# Patient Record
Sex: MALE | Race: WHITE | NOT HISPANIC OR LATINO | Employment: OTHER | URBAN - METROPOLITAN AREA
[De-identification: names, ages, dates, MRNs, and addresses within clinical notes are randomized per-mention and may not be internally consistent; named-entity substitution may affect disease eponyms.]

---

## 2023-05-18 LAB
ALANINE AMINOTRANSFERASE (SGPT) (U/L) IN SER/PLAS: 16 U/L (ref 10–52)
ALBUMIN (G/DL) IN SER/PLAS: 4.2 G/DL (ref 3.4–5)
ALBUMIN (MG/L) IN URINE: <7 MG/L
ALBUMIN/CREATININE (UG/MG) IN URINE: NORMAL UG/MG CRT (ref 0–30)
ALKALINE PHOSPHATASE (U/L) IN SER/PLAS: 57 U/L (ref 33–136)
ANION GAP IN SER/PLAS: 12 MMOL/L (ref 10–20)
ASPARTATE AMINOTRANSFERASE (SGOT) (U/L) IN SER/PLAS: 20 U/L (ref 9–39)
BILIRUBIN TOTAL (MG/DL) IN SER/PLAS: 0.8 MG/DL (ref 0–1.2)
CALCIUM (MG/DL) IN SER/PLAS: 9.2 MG/DL (ref 8.6–10.3)
CARBON DIOXIDE, TOTAL (MMOL/L) IN SER/PLAS: 26 MMOL/L (ref 21–32)
CHLORIDE (MMOL/L) IN SER/PLAS: 107 MMOL/L (ref 98–107)
CHOLESTEROL (MG/DL) IN SER/PLAS: 130 MG/DL (ref 0–199)
CHOLESTEROL IN HDL (MG/DL) IN SER/PLAS: 39.9 MG/DL
CHOLESTEROL/HDL RATIO: 3.3
CREATININE (MG/DL) IN SER/PLAS: 1.45 MG/DL (ref 0.5–1.3)
CREATININE (MG/DL) IN URINE: 122 MG/DL (ref 20–370)
GFR MALE: 51 ML/MIN/1.73M2
GLUCOSE (MG/DL) IN SER/PLAS: 87 MG/DL (ref 74–99)
LDL: 78 MG/DL (ref 0–99)
POTASSIUM (MMOL/L) IN SER/PLAS: 4.7 MMOL/L (ref 3.5–5.3)
PROTEIN TOTAL: 6.7 G/DL (ref 6.4–8.2)
SODIUM (MMOL/L) IN SER/PLAS: 140 MMOL/L (ref 136–145)
TRIGLYCERIDE (MG/DL) IN SER/PLAS: 63 MG/DL (ref 0–149)
URATE (MG/DL) IN SER/PLAS: 7 MG/DL (ref 4–7.5)
UREA NITROGEN (MG/DL) IN SER/PLAS: 35 MG/DL (ref 6–23)
VLDL: 13 MG/DL (ref 0–40)

## 2023-05-24 PROBLEM — I42.2 APICAL VARIANT HYPERTROPHIC CARDIOMYOPATHY (MULTI): Status: ACTIVE | Noted: 2023-05-24

## 2023-05-24 PROBLEM — I48.0 PAROXYSMAL ATRIAL FIBRILLATION (MULTI): Status: ACTIVE | Noted: 2023-05-24

## 2023-05-24 PROBLEM — L57.0 ACTINIC KERATOSIS OF LEFT TEMPLE: Status: ACTIVE | Noted: 2023-05-24

## 2023-05-24 PROBLEM — I43 CARDIOMYOPATHY DUE TO HYPERTENSION, WITHOUT HEART FAILURE (MULTI): Status: ACTIVE | Noted: 2023-05-24

## 2023-05-24 PROBLEM — M1A.00X0 CHRONIC GOUTY ARTHROPATHY: Status: ACTIVE | Noted: 2023-05-24

## 2023-05-24 PROBLEM — I11.9 CARDIOMYOPATHY DUE TO HYPERTENSION, WITHOUT HEART FAILURE (MULTI): Status: ACTIVE | Noted: 2023-05-24

## 2023-05-24 PROBLEM — I10 HYPERTENSION: Status: ACTIVE | Noted: 2023-05-24

## 2023-05-24 PROBLEM — I47.29 NSVT (NONSUSTAINED VENTRICULAR TACHYCARDIA) (MULTI): Status: ACTIVE | Noted: 2023-05-24

## 2023-05-24 PROBLEM — M79.672 LEFT FOOT PAIN: Status: ACTIVE | Noted: 2023-05-24

## 2023-05-24 PROBLEM — C61 PROSTATE CANCER (MULTI): Status: ACTIVE | Noted: 2023-05-24

## 2023-05-24 PROBLEM — H91.10 PRESBYCUSIS: Status: ACTIVE | Noted: 2023-05-24

## 2023-05-24 PROBLEM — M1A.9XX0 CHRONIC GOUTY ARTHROPATHY: Status: ACTIVE | Noted: 2023-05-24

## 2023-05-24 PROBLEM — H93.13 TINNITUS OF BOTH EARS: Status: ACTIVE | Noted: 2023-05-24

## 2023-05-24 PROBLEM — N18.31 STAGE 3A CHRONIC KIDNEY DISEASE (MULTI): Status: ACTIVE | Noted: 2023-05-24

## 2023-05-24 PROBLEM — I31.9: Status: ACTIVE | Noted: 2023-05-24

## 2023-05-24 RX ORDER — AMLODIPINE BESYLATE 5 MG/1
1 TABLET ORAL DAILY
COMMUNITY
Start: 2022-05-19 | End: 2023-11-13 | Stop reason: SDUPTHER

## 2023-05-24 RX ORDER — COLCHICINE 0.6 MG/1
1 TABLET ORAL DAILY
COMMUNITY
Start: 2020-12-28

## 2023-05-24 RX ORDER — METOPROLOL SUCCINATE 100 MG/1
1 TABLET, EXTENDED RELEASE ORAL 2 TIMES DAILY
COMMUNITY
Start: 2022-05-10

## 2023-05-25 ENCOUNTER — OFFICE VISIT (OUTPATIENT)
Dept: PRIMARY CARE | Facility: CLINIC | Age: 73
End: 2023-05-25
Payer: MEDICARE

## 2023-05-25 VITALS
HEIGHT: 71 IN | BODY MASS INDEX: 26.04 KG/M2 | RESPIRATION RATE: 16 BRPM | WEIGHT: 186 LBS | DIASTOLIC BLOOD PRESSURE: 60 MMHG | HEART RATE: 68 BPM | SYSTOLIC BLOOD PRESSURE: 114 MMHG

## 2023-05-25 DIAGNOSIS — I42.2 APICAL VARIANT HYPERTROPHIC CARDIOMYOPATHY (MULTI): ICD-10-CM

## 2023-05-25 DIAGNOSIS — N18.31 STAGE 3A CHRONIC KIDNEY DISEASE (MULTI): ICD-10-CM

## 2023-05-25 DIAGNOSIS — Z00.00 MEDICARE ANNUAL WELLNESS VISIT, SUBSEQUENT: Primary | ICD-10-CM

## 2023-05-25 DIAGNOSIS — C61 PROSTATE CANCER (MULTI): ICD-10-CM

## 2023-05-25 DIAGNOSIS — I48.0 PAROXYSMAL ATRIAL FIBRILLATION (MULTI): ICD-10-CM

## 2023-05-25 DIAGNOSIS — I47.29 NSVT (NONSUSTAINED VENTRICULAR TACHYCARDIA) (MULTI): ICD-10-CM

## 2023-05-25 DIAGNOSIS — I43 CARDIOMYOPATHY DUE TO HYPERTENSION, WITHOUT HEART FAILURE (MULTI): ICD-10-CM

## 2023-05-25 DIAGNOSIS — I11.9 CARDIOMYOPATHY DUE TO HYPERTENSION, WITHOUT HEART FAILURE (MULTI): ICD-10-CM

## 2023-05-25 PROBLEM — I31.9: Status: RESOLVED | Noted: 2023-05-24 | Resolved: 2023-05-25

## 2023-05-25 PROBLEM — I10 HYPERTENSION: Status: RESOLVED | Noted: 2023-05-24 | Resolved: 2023-05-25

## 2023-05-25 PROBLEM — M79.672 LEFT FOOT PAIN: Status: RESOLVED | Noted: 2023-05-24 | Resolved: 2023-05-25

## 2023-05-25 PROCEDURE — 1159F MED LIST DOCD IN RCRD: CPT | Performed by: INTERNAL MEDICINE

## 2023-05-25 PROCEDURE — 1170F FXNL STATUS ASSESSED: CPT | Performed by: INTERNAL MEDICINE

## 2023-05-25 PROCEDURE — G0442 ANNUAL ALCOHOL SCREEN 15 MIN: HCPCS | Performed by: INTERNAL MEDICINE

## 2023-05-25 PROCEDURE — 1160F RVW MEDS BY RX/DR IN RCRD: CPT | Performed by: INTERNAL MEDICINE

## 2023-05-25 PROCEDURE — G0444 DEPRESSION SCREEN ANNUAL: HCPCS | Performed by: INTERNAL MEDICINE

## 2023-05-25 PROCEDURE — 99214 OFFICE O/P EST MOD 30 MIN: CPT | Performed by: INTERNAL MEDICINE

## 2023-05-25 PROCEDURE — 1036F TOBACCO NON-USER: CPT | Performed by: INTERNAL MEDICINE

## 2023-05-25 PROCEDURE — G0439 PPPS, SUBSEQ VISIT: HCPCS | Performed by: INTERNAL MEDICINE

## 2023-05-25 PROCEDURE — 99497 ADVNCD CARE PLAN 30 MIN: CPT | Performed by: INTERNAL MEDICINE

## 2023-05-25 RX ORDER — ALLOPURINOL 100 MG/1
100 TABLET ORAL DAILY
COMMUNITY
Start: 2023-05-11 | End: 2023-11-13 | Stop reason: SDUPTHER

## 2023-05-25 ASSESSMENT — ENCOUNTER SYMPTOMS
OCCASIONAL FEELINGS OF UNSTEADINESS: 0
DEPRESSION: 0
LOSS OF SENSATION IN FEET: 0

## 2023-05-25 ASSESSMENT — ANXIETY QUESTIONNAIRES
3. WORRYING TOO MUCH ABOUT DIFFERENT THINGS: NOT AT ALL
4. TROUBLE RELAXING: NOT AT ALL
GAD7 TOTAL SCORE: 0
2. NOT BEING ABLE TO STOP OR CONTROL WORRYING: NOT AT ALL
7. FEELING AFRAID AS IF SOMETHING AWFUL MIGHT HAPPEN: NOT AT ALL
6. BECOMING EASILY ANNOYED OR IRRITABLE: NOT AT ALL
5. BEING SO RESTLESS THAT IT IS HARD TO SIT STILL: NOT AT ALL
IF YOU CHECKED OFF ANY PROBLEMS ON THIS QUESTIONNAIRE, HOW DIFFICULT HAVE THESE PROBLEMS MADE IT FOR YOU TO DO YOUR WORK, TAKE CARE OF THINGS AT HOME, OR GET ALONG WITH OTHER PEOPLE: NOT DIFFICULT AT ALL
1. FEELING NERVOUS, ANXIOUS, OR ON EDGE: NOT AT ALL

## 2023-05-25 ASSESSMENT — ACTIVITIES OF DAILY LIVING (ADL)
GROCERY_SHOPPING: INDEPENDENT
TAKING_MEDICATION: INDEPENDENT
DOING_HOUSEWORK: INDEPENDENT
MANAGING_FINANCES: INDEPENDENT
BATHING: INDEPENDENT
DRESSING: INDEPENDENT

## 2023-05-25 ASSESSMENT — PATIENT HEALTH QUESTIONNAIRE - PHQ9
SUM OF ALL RESPONSES TO PHQ9 QUESTIONS 1 AND 2: 0
2. FEELING DOWN, DEPRESSED OR HOPELESS: NOT AT ALL
1. LITTLE INTEREST OR PLEASURE IN DOING THINGS: NOT AT ALL

## 2023-05-25 NOTE — ASSESSMENT & PLAN NOTE
Blood pressure well controlled on amlodipine 5 mg daily and metoprolol succinate  mg daily well compensated

## 2023-05-25 NOTE — PROGRESS NOTES
"Subjective   Reason for Visit: Erwin Peña is an 72 y.o. male here for a Medicare Wellness visit.     Past Medical, Surgical, and Family History reviewed and updated in chart.    Reviewed all medications by prescribing practitioner or clinical pharmacist (such as prescriptions, OTCs, herbal therapies and supplements) and documented in the medical record.    HPI    Patient Care Team:  Trent Cantu DO as PCP - General  Trent Cantu DO as PCP - [facility] Attributed Provider     Review of Systems    Objective   Vitals:  /60   Pulse 68   Resp 16   Ht 1.803 m (5' 11\")   Wt 84.4 kg (186 lb)   BMI 25.94 kg/m²       Physical Exam    Assessment/Plan   Problem List Items Addressed This Visit    None         "

## 2023-05-25 NOTE — ASSESSMENT & PLAN NOTE
Heart EKG at home device stable on metoprolol continue Eliquis 5 mg twice a day for stroke risk reductionStable watches with Apple watch and [employer] device

## 2023-05-25 NOTE — ASSESSMENT & PLAN NOTE
Advanced medical directive discussed patient defers pneumococcal vaccine at this time did receive Shingrix vaccine in the past screenings for alcohol depression completed weight stable BMI 25.Cologuard testing completed May 24, 2022 good for 3 years negative reevaluate in 6 months

## 2023-05-25 NOTE — ASSESSMENT & PLAN NOTE
No evidence of recurrent ventricular arrhythmia patient decide whether he would want a defibrillator replaced in the future continue to monitor for now ejection fraction well preserved

## 2023-05-25 NOTE — ASSESSMENT & PLAN NOTE
>>ASSESSMENT AND PLAN FOR APICAL VARIANT HYPERTROPHIC CARDIOMYOPATHY (CMS/HCC) WRITTEN ON 5/25/2023 11:09 AM BY DESTINY AVALOS, DO    Recently evaluated by [facility] cardiology had defibrillator removed which resulted and pericardial tamponade and major bleeding does not wish defibrillator to be replaced last echocardiogram 2022 revealed 70% ejection fraction diastolic dysfunction documented history of paroxysmal ventricular tachyarrhythmia associated with hypertrophic cardiomyopathy apical variant stable at this time continue beta-blocker patient does not wish antiarrhythmic therapy follow with cardiologist on a yearly basis    >>ASSESSMENT AND PLAN FOR CARDIOMYOPATHY DUE TO HYPERTENSION, WITHOUT HEART FAILURE (CMS/HCC) WRITTEN ON 5/25/2023 11:09 AM BY DESTINY AVALOS, DO    Blood pressure well controlled on amlodipine 5 mg daily and metoprolol succinate  mg daily well compensated

## 2023-05-25 NOTE — ASSESSMENT & PLAN NOTE
No recurrent events of chronic acute on chronic gouty arthritis continue on colchicine 0.6 mg 1 tablet daily with allopurinol 100 mg daily uric acid acid level improved from 8.4-7.0

## 2023-05-25 NOTE — PROGRESS NOTES
"Subjective   Reason for Visit: Erwin Peña is an 72 y.o. male here for a Medicare Wellness visit.     Past Medical, Surgical, and Family History reviewed and updated in chart.    Reviewed all medications by prescribing practitioner or clinical pharmacist (such as prescriptions, OTCs, herbal therapies and supplements) and documented in the medical record.    HPI    Patient Care Team:  Trent Cantu DO as PCP - General  Trent Cantu DO as PCP - [facility] Attributed Provider     Review of Systems   All other systems reviewed and are negative.      Objective   Vitals:  /60   Pulse 68   Resp 16   Ht 1.803 m (5' 11\")   Wt 84.4 kg (186 lb)   BMI 25.94 kg/m²       Physical Exam  Vitals and nursing note reviewed.   Constitutional:       General: He is not in acute distress.     Appearance: Normal appearance. He is well-developed. He is not toxic-appearing.   HENT:      Head: Normocephalic and atraumatic.      Right Ear: Tympanic membrane and external ear normal.      Left Ear: Tympanic membrane and external ear normal.      Nose: Nose normal.      Mouth/Throat:      Mouth: Mucous membranes are moist.      Pharynx: Oropharynx is clear. No oropharyngeal exudate or posterior oropharyngeal erythema.      Tonsils: No tonsillar exudate. 2+ on the right. 2+ on the left.   Eyes:      Extraocular Movements: Extraocular movements intact.      Conjunctiva/sclera: Conjunctivae normal.   Cardiovascular:      Rate and Rhythm: Normal rate and regular rhythm.      Pulses: Normal pulses.      Heart sounds: Normal heart sounds. No murmur heard.  Pulmonary:      Effort: Pulmonary effort is normal.      Breath sounds: Normal breath sounds.   Abdominal:      General: Abdomen is flat. Bowel sounds are normal.      Palpations: Abdomen is soft.   Musculoskeletal:      Cervical back: Neck supple.   Feet:      Right foot:      Skin integrity: Skin integrity normal. No ulcer, blister, skin breakdown, erythema, warmth or " callus.      Toenail Condition: Right toenails are normal.      Left foot:      Skin integrity: Skin integrity normal. No ulcer, blister, skin breakdown, erythema, warmth or callus.      Toenail Condition: Left toenails are normal.   Lymphadenopathy:      Cervical: No cervical adenopathy.   Skin:     General: Skin is warm and dry.      Capillary Refill: Capillary refill takes more than 3 seconds.      Findings: No rash.   Neurological:      Mental Status: He is alert. Mental status is at baseline.      Sensory: Sensation is intact.   Psychiatric:         Mood and Affect: Mood normal.         Behavior: Behavior normal.         Thought Content: Thought content normal.         Judgment: Judgment normal.         Assessment/Plan   Problem List Items Addressed This Visit          Circulatory    Apical variant hypertrophic cardiomyopathy (CMS/HCC)    Current Assessment & Plan     Recently evaluated by [facility] cardiology had defibrillator removed which resulted and pericardial tamponade and major bleeding does not wish defibrillator to be replaced last echocardiogram 2022 revealed 70% ejection fraction diastolic dysfunction documented history of paroxysmal ventricular tachyarrhythmia associated with hypertrophic cardiomyopathy apical variant stable at this time continue beta-blocker patient does not wish antiarrhythmic therapy follow with cardiologist on a yearly basis         Relevant Orders    Follow Up In [facility] - PCP    Hepatitis C antibody    Comprehensive metabolic panel    Lipid Panel    Cardiomyopathy due to hypertension, without heart failure (CMS/HCC)    Current Assessment & Plan     Blood pressure well controlled on amlodipine 5 mg daily and metoprolol succinate  mg daily well compensated         Relevant Orders    Follow Up In [facility] - PCP    Hepatitis C antibody    Comprehensive metabolic panel    Lipid Panel    NSVT (nonsustained ventricular tachycardia) (CMS/HCC)     Current Assessment & Plan     No evidence of recurrent ventricular arrhythmia patient decide whether he would want a defibrillator replaced in the future continue to monitor for now ejection fraction well preserved         Relevant Orders    Follow Up In [facility] - PCP    Hepatitis C antibody    Comprehensive metabolic panel    Lipid Panel    Paroxysmal atrial fibrillation (CMS/HCC)    Current Assessment & Plan     Heart EKG at home device stable on metoprolol continue Eliquis 5 mg twice a day for stroke risk reductionStable watches with Apple watch and [employer] device          Relevant Orders    Follow Up In [facility] - PCP    Hepatitis C antibody    Comprehensive metabolic panel    Lipid Panel       Genitourinary    Prostate cancer (CMS/HCC)    Current Assessment & Plan     Follows annually with [facility] physician with PSA urology stable         Relevant Orders    Follow Up In [facility] - PCP    Hepatitis C antibody    Comprehensive metabolic panel    Lipid Panel    Stage 3a chronic kidney disease    Current Assessment & Plan     Stable creatinine of 1.4 GFR 55-58 no evidence of proteinuria monitor 6-month basis            Other    Medicare annual wellness visit, subsequent - Primary    Current Assessment & Plan     Advanced medical directive discussed patient defers pneumococcal vaccine at this time did receive Shingrix vaccine in the past screenings for alcohol depression completed weight stable BMI 25.Cologuard testing completed May 24, 2022 good for 3 years negative reevaluate in 6 months         Relevant Orders    Follow Up In [facility] - PCP    Hepatitis C antibody    Comprehensive metabolic panel    Lipid Panel     Other Visit Diagnoses       Routine general medical examination at health care facility

## 2023-11-13 ENCOUNTER — TELEPHONE (OUTPATIENT)
Dept: PRIMARY CARE | Facility: CLINIC | Age: 73
End: 2023-11-13
Payer: MEDICARE

## 2023-11-13 DIAGNOSIS — I48.0 PAROXYSMAL ATRIAL FIBRILLATION (MULTI): ICD-10-CM

## 2023-11-13 DIAGNOSIS — I11.9 CARDIOMYOPATHY DUE TO HYPERTENSION, WITHOUT HEART FAILURE (MULTI): ICD-10-CM

## 2023-11-13 DIAGNOSIS — I43 CARDIOMYOPATHY DUE TO HYPERTENSION, WITHOUT HEART FAILURE (MULTI): ICD-10-CM

## 2023-11-13 DIAGNOSIS — M1A.00X0 CHRONIC GOUTY ARTHROPATHY: ICD-10-CM

## 2023-11-13 RX ORDER — AMLODIPINE BESYLATE 5 MG/1
5 TABLET ORAL DAILY
Qty: 90 TABLET | Refills: 3 | Status: SHIPPED | OUTPATIENT
Start: 2023-11-13

## 2023-11-13 RX ORDER — ALLOPURINOL 100 MG/1
100 TABLET ORAL DAILY
Qty: 90 TABLET | Refills: 3 | Status: SHIPPED | OUTPATIENT
Start: 2023-11-13

## 2023-11-13 NOTE — TELEPHONE ENCOUNTER
Patient requesting a refill on  Allopurinol 100mg Caps every day  Amlodipine 5mg every day   Eliquis 5mg BID  Jose G Low

## 2023-11-21 ENCOUNTER — LAB (OUTPATIENT)
Dept: LAB | Facility: LAB | Age: 73
End: 2023-11-21
Payer: MEDICARE

## 2023-11-21 DIAGNOSIS — I42.2 APICAL VARIANT HYPERTROPHIC CARDIOMYOPATHY (MULTI): ICD-10-CM

## 2023-11-21 DIAGNOSIS — C61 PROSTATE CANCER (MULTI): ICD-10-CM

## 2023-11-21 DIAGNOSIS — I43 CARDIOMYOPATHY DUE TO HYPERTENSION, WITHOUT HEART FAILURE (MULTI): ICD-10-CM

## 2023-11-21 DIAGNOSIS — Z00.00 MEDICARE ANNUAL WELLNESS VISIT, SUBSEQUENT: ICD-10-CM

## 2023-11-21 DIAGNOSIS — I47.29 NSVT (NONSUSTAINED VENTRICULAR TACHYCARDIA) (MULTI): ICD-10-CM

## 2023-11-21 DIAGNOSIS — I48.0 PAROXYSMAL ATRIAL FIBRILLATION (MULTI): ICD-10-CM

## 2023-11-21 DIAGNOSIS — I11.9 CARDIOMYOPATHY DUE TO HYPERTENSION, WITHOUT HEART FAILURE (MULTI): ICD-10-CM

## 2023-11-21 LAB
ALBUMIN SERPL BCP-MCNC: 4.2 G/DL (ref 3.4–5)
ALP SERPL-CCNC: 62 U/L (ref 33–136)
ALT SERPL W P-5'-P-CCNC: 15 U/L (ref 10–52)
ANION GAP SERPL CALC-SCNC: 12 MMOL/L (ref 10–20)
AST SERPL W P-5'-P-CCNC: 19 U/L (ref 9–39)
BILIRUB SERPL-MCNC: 0.9 MG/DL (ref 0–1.2)
BUN SERPL-MCNC: 29 MG/DL (ref 6–23)
CALCIUM SERPL-MCNC: 9.2 MG/DL (ref 8.6–10.3)
CHLORIDE SERPL-SCNC: 104 MMOL/L (ref 98–107)
CHOLEST SERPL-MCNC: 138 MG/DL (ref 0–199)
CHOLESTEROL/HDL RATIO: 3.1
CO2 SERPL-SCNC: 28 MMOL/L (ref 21–32)
CREAT SERPL-MCNC: 1.38 MG/DL (ref 0.5–1.3)
GFR SERPL CREATININE-BSD FRML MDRD: 54 ML/MIN/1.73M*2
GLUCOSE SERPL-MCNC: 87 MG/DL (ref 74–99)
HCV AB SER QL: NONREACTIVE
HDLC SERPL-MCNC: 45 MG/DL
LDLC SERPL CALC-MCNC: 78 MG/DL
NON HDL CHOLESTEROL: 93 MG/DL (ref 0–149)
POTASSIUM SERPL-SCNC: 4.3 MMOL/L (ref 3.5–5.3)
PROT SERPL-MCNC: 6.4 G/DL (ref 6.4–8.2)
SODIUM SERPL-SCNC: 140 MMOL/L (ref 136–145)
TRIGL SERPL-MCNC: 77 MG/DL (ref 0–149)
VLDL: 15 MG/DL (ref 0–40)

## 2023-11-21 PROCEDURE — 80053 COMPREHEN METABOLIC PANEL: CPT

## 2023-11-21 PROCEDURE — 86803 HEPATITIS C AB TEST: CPT

## 2023-11-21 PROCEDURE — 36415 COLL VENOUS BLD VENIPUNCTURE: CPT

## 2023-11-21 PROCEDURE — 80061 LIPID PANEL: CPT

## 2023-11-27 ENCOUNTER — APPOINTMENT (OUTPATIENT)
Dept: PRIMARY CARE | Facility: CLINIC | Age: 73
End: 2023-11-27
Payer: MEDICARE

## 2024-01-19 ENCOUNTER — APPOINTMENT (OUTPATIENT)
Dept: PRIMARY CARE | Facility: CLINIC | Age: 74
End: 2024-01-19
Payer: MEDICARE

## 2024-01-31 ENCOUNTER — OFFICE VISIT (OUTPATIENT)
Dept: PRIMARY CARE | Facility: CLINIC | Age: 74
End: 2024-01-31
Payer: MEDICARE

## 2024-01-31 VITALS
HEIGHT: 71 IN | HEART RATE: 60 BPM | WEIGHT: 189 LBS | SYSTOLIC BLOOD PRESSURE: 128 MMHG | BODY MASS INDEX: 26.46 KG/M2 | DIASTOLIC BLOOD PRESSURE: 60 MMHG

## 2024-01-31 DIAGNOSIS — C61 PROSTATE CANCER (MULTI): ICD-10-CM

## 2024-01-31 DIAGNOSIS — I43 CARDIOMYOPATHY DUE TO HYPERTENSION, WITHOUT HEART FAILURE (MULTI): ICD-10-CM

## 2024-01-31 DIAGNOSIS — I48.0 PAROXYSMAL ATRIAL FIBRILLATION (MULTI): ICD-10-CM

## 2024-01-31 DIAGNOSIS — N18.31 STAGE 3A CHRONIC KIDNEY DISEASE (MULTI): ICD-10-CM

## 2024-01-31 DIAGNOSIS — I42.2 APICAL VARIANT HYPERTROPHIC CARDIOMYOPATHY (MULTI): Primary | ICD-10-CM

## 2024-01-31 DIAGNOSIS — I11.9 CARDIOMYOPATHY DUE TO HYPERTENSION, WITHOUT HEART FAILURE (MULTI): ICD-10-CM

## 2024-01-31 DIAGNOSIS — L57.0 ACTINIC KERATOSIS OF LEFT TEMPLE: ICD-10-CM

## 2024-01-31 DIAGNOSIS — I47.29 NSVT (NONSUSTAINED VENTRICULAR TACHYCARDIA) (MULTI): ICD-10-CM

## 2024-01-31 PROCEDURE — 1160F RVW MEDS BY RX/DR IN RCRD: CPT | Performed by: INTERNAL MEDICINE

## 2024-01-31 PROCEDURE — 1036F TOBACCO NON-USER: CPT | Performed by: INTERNAL MEDICINE

## 2024-01-31 PROCEDURE — 99214 OFFICE O/P EST MOD 30 MIN: CPT | Performed by: INTERNAL MEDICINE

## 2024-01-31 PROCEDURE — 1159F MED LIST DOCD IN RCRD: CPT | Performed by: INTERNAL MEDICINE

## 2024-01-31 ASSESSMENT — ANXIETY QUESTIONNAIRES
5. BEING SO RESTLESS THAT IT IS HARD TO SIT STILL: NOT AT ALL
IF YOU CHECKED OFF ANY PROBLEMS ON THIS QUESTIONNAIRE, HOW DIFFICULT HAVE THESE PROBLEMS MADE IT FOR YOU TO DO YOUR WORK, TAKE CARE OF THINGS AT HOME, OR GET ALONG WITH OTHER PEOPLE: NOT DIFFICULT AT ALL
GAD7 TOTAL SCORE: 0
2. NOT BEING ABLE TO STOP OR CONTROL WORRYING: NOT AT ALL
1. FEELING NERVOUS, ANXIOUS, OR ON EDGE: NOT AT ALL
4. TROUBLE RELAXING: NOT AT ALL
3. WORRYING TOO MUCH ABOUT DIFFERENT THINGS: NOT AT ALL
7. FEELING AFRAID AS IF SOMETHING AWFUL MIGHT HAPPEN: NOT AT ALL
6. BECOMING EASILY ANNOYED OR IRRITABLE: NOT AT ALL

## 2024-01-31 ASSESSMENT — COLUMBIA-SUICIDE SEVERITY RATING SCALE - C-SSRS
1. IN THE PAST MONTH, HAVE YOU WISHED YOU WERE DEAD OR WISHED YOU COULD GO TO SLEEP AND NOT WAKE UP?: NO
6. HAVE YOU EVER DONE ANYTHING, STARTED TO DO ANYTHING, OR PREPARED TO DO ANYTHING TO END YOUR LIFE?: NO
2. HAVE YOU ACTUALLY HAD ANY THOUGHTS OF KILLING YOURSELF?: NO

## 2024-01-31 ASSESSMENT — ENCOUNTER SYMPTOMS
LOSS OF SENSATION IN FEET: 0
DEPRESSION: 0
OCCASIONAL FEELINGS OF UNSTEADINESS: 0

## 2024-01-31 ASSESSMENT — PATIENT HEALTH QUESTIONNAIRE - PHQ9
1. LITTLE INTEREST OR PLEASURE IN DOING THINGS: NOT AT ALL
2. FEELING DOWN, DEPRESSED OR HOPELESS: NOT AT ALL
SUM OF ALL RESPONSES TO PHQ9 QUESTIONS 1 AND 2: 0

## 2024-01-31 NOTE — ASSESSMENT & PLAN NOTE
Watchful waiting at this time PSA levels stabilized at 6 cans consider ADT therapy of symptoms change or PSA continues to elevate follows with urologist at [facility]

## 2024-01-31 NOTE — ASSESSMENT & PLAN NOTE
No major recurrent flares at this time continues on allopurinol 100 mg daily with as needed use of colchicine check uric acid levels with next visit

## 2024-01-31 NOTE — ASSESSMENT & PLAN NOTE
Patient with complications with insertion of ICD resulted in pericarditis and bleeding patient does not wish to entertain ICD will discuss with cardiologist may be a candidate for wireless ICD in the future continue with echocardiogram ejection fraction improved at this time no symptoms of congestive heart failure blood pressure well-controlled on amlodipine 5 mg daily

## 2024-01-31 NOTE — ASSESSMENT & PLAN NOTE
Stable rate and rhythm controlled continue metoprolol succinate  mg daily and apixaban 5 mg twice a day follow with cardiology

## 2024-01-31 NOTE — PROGRESS NOTES
"Subjective   Patient ID: Erwin Peña is a 73 y.o. male who presents for Follow-up.    HPI     Review of Systems    Objective   Ht 1.803 m (5' 11\")   Wt 85.7 kg (189 lb)   BMI 26.36 kg/m²     Physical Exam    Assessment/Plan          "

## 2024-01-31 NOTE — ASSESSMENT & PLAN NOTE
History of melanoma scapular area follows with dermatologist on an annual basis stable treatment of actinic keratosis

## 2024-01-31 NOTE — ASSESSMENT & PLAN NOTE
GFR of 54 with creatinine of 1.4 unchanged in last 3 years check microalbuminuria may be candidate for SGLT2 inhibitor therapy to reduce risk of CHF

## 2024-01-31 NOTE — PROGRESS NOTES
"Subjective   Reason for Visit: Erwin Peña is an 73 y.o. male here for a fu visit.     Past Medical, Surgical, and Family History reviewed and updated in chart.         HPI    Patient Care Team:  Trent Cantu DO as PCP - General  Trent Cantu DO as PCP - [facility] Attributed Provider     Review of Systems   All other systems reviewed and are negative.      Objective   Vitals:  /60 (BP Location: Right arm, Patient Position: Sitting)   Pulse 60   Ht 1.803 m (5' 11\")   Wt 85.7 kg (189 lb)   BMI 26.36 kg/m²       Physical Exam  Vitals and nursing note reviewed.   Constitutional:       General: He is not in acute distress.     Appearance: Normal appearance. He is well-developed. He is not toxic-appearing.   HENT:      Head: Normocephalic and atraumatic.      Right Ear: Tympanic membrane and external ear normal.      Left Ear: Tympanic membrane and external ear normal.      Nose: Nose normal.      Mouth/Throat:      Mouth: Mucous membranes are moist.      Pharynx: Oropharynx is clear. No oropharyngeal exudate or posterior oropharyngeal erythema.      Tonsils: No tonsillar exudate. 2+ on the right. 2+ on the left.   Eyes:      Extraocular Movements: Extraocular movements intact.      Conjunctiva/sclera: Conjunctivae normal.   Cardiovascular:      Rate and Rhythm: Normal rate and regular rhythm.      Pulses: Normal pulses.      Heart sounds: Normal heart sounds. No murmur heard.  Pulmonary:      Effort: Pulmonary effort is normal.      Breath sounds: Normal breath sounds.   Abdominal:      General: Abdomen is flat. Bowel sounds are normal.      Palpations: Abdomen is soft.   Musculoskeletal:      Cervical back: Neck supple.   Feet:      Right foot:      Skin integrity: Skin integrity normal. No ulcer, blister, skin breakdown, erythema, warmth or callus.      Toenail Condition: Right toenails are normal.      Left foot:      Skin integrity: Skin integrity normal. No ulcer, blister, skin breakdown, " erythema, warmth or callus.      Toenail Condition: Left toenails are normal.   Lymphadenopathy:      Cervical: No cervical adenopathy.   Skin:     General: Skin is warm and dry.      Capillary Refill: Capillary refill takes more than 3 seconds.      Findings: No rash.   Neurological:      Mental Status: He is alert. Mental status is at baseline.      Sensory: Sensation is intact.   Psychiatric:         Mood and Affect: Mood normal.         Behavior: Behavior normal.         Thought Content: Thought content normal.         Judgment: Judgment normal.         Assessment/Plan   Problem List Items Addressed This Visit       Actinic keratosis of left temple    Current Assessment & Plan     History of melanoma scapular area follows with dermatologist on an annual basis stable treatment of actinic keratosis         Apical variant hypertrophic cardiomyopathy (CMS/HCC) - Primary    Current Assessment & Plan     Patient with complications with insertion of ICD resulted in pericarditis and bleeding patient does not wish to entertain ICD will discuss with cardiologist may be a candidate for wireless ICD in the future continue with echocardiogram ejection fraction improved at this time no symptoms of congestive heart failure blood pressure well-controlled on amlodipine 5 mg daily         Relevant Orders    CBC and Auto Differential    Comprehensive metabolic panel    Uric acid    Albumin, urine, random    Lipid Panel    Follow Up In [facility] - PCP - Established    NSVT (nonsustained ventricular tachycardia) (CMS/HCC)    Current Assessment & Plan     No sustained ventricular events at this time continue to monitor         Relevant Orders    CBC and Auto Differential    Comprehensive metabolic panel    Uric acid    Albumin, urine, random    Lipid Panel    Follow Up In [facility] - PCP - Established    Paroxysmal atrial fibrillation (CMS/HCC)    Current Assessment & Plan     Stable rate and rhythm controlled  continue metoprolol succinate  mg daily and apixaban 5 mg twice a day follow with cardiology         Relevant Orders    CBC and Auto Differential    Comprehensive metabolic panel    Uric acid    Albumin, urine, random    Lipid Panel    Follow Up In [facility] - PCP - Established    Prostate cancer (CMS/[facility])    Current Assessment & Plan     Watchful waiting at this time PSA levels stabilized at 6 cans consider ADT therapy of symptoms change or PSA continues to elevate follows with urologist at [facility]         Relevant Orders    CBC and Auto Differential    Comprehensive metabolic panel    Uric acid    Albumin, urine, random    Lipid Panel    Follow Up In [facility] - PCP - Established    Stage 3a chronic kidney disease (CMS/[facility])    Current Assessment & Plan     GFR of 54 with creatinine of 1.4 unchanged in last 3 years check microalbuminuria may be candidate for SGLT2 inhibitor therapy to reduce risk of CHF         Relevant Orders    CBC and Auto Differential    Comprehensive metabolic panel    Uric acid    Albumin, urine, random    Lipid Panel    Follow Up In [facility] - PCP - Established    Cardiomyopathy due to hypertension, without heart failure (CMS/HCC)    Current Assessment & Plan     Well compensated at this time continue to monitor with cardiologist improvement in ejection fractionContinue metoprolol succinate  mg daily with amlodipine 5 mg daily         Relevant Orders    CBC and Auto Differential    Comprehensive metabolic panel    Uric acid    Albumin, urine, random    Lipid Panel    Follow Up In [facility] - PCP - Established

## 2024-02-01 PROBLEM — I43 CARDIOMYOPATHY DUE TO HYPERTENSION, WITHOUT HEART FAILURE (MULTI): Status: ACTIVE | Noted: 2024-02-01

## 2024-02-01 PROBLEM — I11.9 CARDIOMYOPATHY DUE TO HYPERTENSION, WITHOUT HEART FAILURE (MULTI): Status: ACTIVE | Noted: 2024-02-01

## 2024-02-02 NOTE — ASSESSMENT & PLAN NOTE
Well compensated at this time continue to monitor with cardiologist improvement in ejection fractionContinue metoprolol succinate  mg daily with amlodipine 5 mg daily

## 2024-07-29 ENCOUNTER — LAB (OUTPATIENT)
Dept: LAB | Facility: LAB | Age: 74
End: 2024-07-29
Payer: MEDICARE

## 2024-07-29 DIAGNOSIS — I47.29 NSVT (NONSUSTAINED VENTRICULAR TACHYCARDIA) (MULTI): ICD-10-CM

## 2024-07-29 DIAGNOSIS — I43 CARDIOMYOPATHY DUE TO HYPERTENSION, WITHOUT HEART FAILURE (MULTI): ICD-10-CM

## 2024-07-29 DIAGNOSIS — I11.9 CARDIOMYOPATHY DUE TO HYPERTENSION, WITHOUT HEART FAILURE (MULTI): ICD-10-CM

## 2024-07-29 DIAGNOSIS — I42.2 APICAL VARIANT HYPERTROPHIC CARDIOMYOPATHY (MULTI): ICD-10-CM

## 2024-07-29 DIAGNOSIS — I48.0 PAROXYSMAL ATRIAL FIBRILLATION (MULTI): ICD-10-CM

## 2024-07-29 DIAGNOSIS — N18.31 STAGE 3A CHRONIC KIDNEY DISEASE (MULTI): ICD-10-CM

## 2024-07-29 DIAGNOSIS — C61 PROSTATE CANCER (MULTI): ICD-10-CM

## 2024-07-29 LAB
ALBUMIN SERPL BCP-MCNC: 4 G/DL (ref 3.4–5)
ALP SERPL-CCNC: 68 U/L (ref 33–136)
ALT SERPL W P-5'-P-CCNC: 15 U/L (ref 10–52)
ANION GAP SERPL CALC-SCNC: 11 MMOL/L (ref 10–20)
AST SERPL W P-5'-P-CCNC: 15 U/L (ref 9–39)
BASOPHILS # BLD AUTO: 0.03 X10*3/UL (ref 0–0.1)
BASOPHILS NFR BLD AUTO: 0.5 %
BILIRUB SERPL-MCNC: 1.1 MG/DL (ref 0–1.2)
BUN SERPL-MCNC: 26 MG/DL (ref 6–23)
CALCIUM SERPL-MCNC: 9 MG/DL (ref 8.6–10.3)
CHLORIDE SERPL-SCNC: 105 MMOL/L (ref 98–107)
CHOLEST SERPL-MCNC: 129 MG/DL (ref 0–199)
CHOLESTEROL/HDL RATIO: 3.3
CO2 SERPL-SCNC: 28 MMOL/L (ref 21–32)
CREAT SERPL-MCNC: 1.42 MG/DL (ref 0.5–1.3)
CREAT UR-MCNC: 159.6 MG/DL (ref 20–370)
EGFRCR SERPLBLD CKD-EPI 2021: 53 ML/MIN/1.73M*2
EOSINOPHIL # BLD AUTO: 0.14 X10*3/UL (ref 0–0.4)
EOSINOPHIL NFR BLD AUTO: 2.4 %
ERYTHROCYTE [DISTWIDTH] IN BLOOD BY AUTOMATED COUNT: 12.9 % (ref 11.5–14.5)
GLUCOSE SERPL-MCNC: 82 MG/DL (ref 74–99)
HCT VFR BLD AUTO: 47.4 % (ref 41–52)
HDLC SERPL-MCNC: 38.6 MG/DL
HGB BLD-MCNC: 15.3 G/DL (ref 13.5–17.5)
IMM GRANULOCYTES # BLD AUTO: 0.02 X10*3/UL (ref 0–0.5)
IMM GRANULOCYTES NFR BLD AUTO: 0.3 % (ref 0–0.9)
LDLC SERPL CALC-MCNC: 73 MG/DL
LYMPHOCYTES # BLD AUTO: 1.48 X10*3/UL (ref 0.8–3)
LYMPHOCYTES NFR BLD AUTO: 25 %
MCH RBC QN AUTO: 29.9 PG (ref 26–34)
MCHC RBC AUTO-ENTMCNC: 32.3 G/DL (ref 32–36)
MCV RBC AUTO: 93 FL (ref 80–100)
MICROALBUMIN UR-MCNC: 10.7 MG/L
MICROALBUMIN/CREAT UR: 6.7 UG/MG CREAT
MONOCYTES # BLD AUTO: 0.54 X10*3/UL (ref 0.05–0.8)
MONOCYTES NFR BLD AUTO: 9.1 %
NEUTROPHILS # BLD AUTO: 3.72 X10*3/UL (ref 1.6–5.5)
NEUTROPHILS NFR BLD AUTO: 62.7 %
NON HDL CHOLESTEROL: 90 MG/DL (ref 0–149)
NRBC BLD-RTO: 0 /100 WBCS (ref 0–0)
PLATELET # BLD AUTO: 177 X10*3/UL (ref 150–450)
POTASSIUM SERPL-SCNC: 4.4 MMOL/L (ref 3.5–5.3)
PROT SERPL-MCNC: 6.3 G/DL (ref 6.4–8.2)
RBC # BLD AUTO: 5.11 X10*6/UL (ref 4.5–5.9)
SODIUM SERPL-SCNC: 140 MMOL/L (ref 136–145)
TRIGL SERPL-MCNC: 88 MG/DL (ref 0–149)
URATE SERPL-MCNC: 6.5 MG/DL (ref 4–7.5)
VLDL: 18 MG/DL (ref 0–40)
WBC # BLD AUTO: 5.9 X10*3/UL (ref 4.4–11.3)

## 2024-07-29 PROCEDURE — 80053 COMPREHEN METABOLIC PANEL: CPT

## 2024-07-29 PROCEDURE — 80061 LIPID PANEL: CPT

## 2024-07-29 PROCEDURE — 36415 COLL VENOUS BLD VENIPUNCTURE: CPT

## 2024-07-29 PROCEDURE — 85025 COMPLETE CBC W/AUTO DIFF WBC: CPT

## 2024-07-29 PROCEDURE — 82570 ASSAY OF URINE CREATININE: CPT

## 2024-07-29 PROCEDURE — 82043 UR ALBUMIN QUANTITATIVE: CPT

## 2024-07-29 PROCEDURE — 84550 ASSAY OF BLOOD/URIC ACID: CPT

## 2024-07-31 ENCOUNTER — APPOINTMENT (OUTPATIENT)
Dept: PRIMARY CARE | Facility: CLINIC | Age: 74
End: 2024-07-31
Payer: MEDICARE

## 2024-07-31 VITALS
SYSTOLIC BLOOD PRESSURE: 114 MMHG | HEIGHT: 71 IN | DIASTOLIC BLOOD PRESSURE: 70 MMHG | WEIGHT: 174 LBS | HEART RATE: 66 BPM | BODY MASS INDEX: 24.36 KG/M2

## 2024-07-31 DIAGNOSIS — M1A.00X0 CHRONIC GOUTY ARTHROPATHY: ICD-10-CM

## 2024-07-31 DIAGNOSIS — Z00.00 MEDICARE ANNUAL WELLNESS VISIT, SUBSEQUENT: Primary | ICD-10-CM

## 2024-07-31 DIAGNOSIS — N18.31 STAGE 3A CHRONIC KIDNEY DISEASE (MULTI): ICD-10-CM

## 2024-07-31 DIAGNOSIS — I47.29 NSVT (NONSUSTAINED VENTRICULAR TACHYCARDIA) (MULTI): ICD-10-CM

## 2024-07-31 DIAGNOSIS — I43 CARDIOMYOPATHY DUE TO HYPERTENSION, WITHOUT HEART FAILURE (MULTI): ICD-10-CM

## 2024-07-31 DIAGNOSIS — I11.9 CARDIOMYOPATHY DUE TO HYPERTENSION, WITHOUT HEART FAILURE (MULTI): ICD-10-CM

## 2024-07-31 DIAGNOSIS — I48.0 PAROXYSMAL ATRIAL FIBRILLATION (MULTI): ICD-10-CM

## 2024-07-31 DIAGNOSIS — I42.2 APICAL VARIANT HYPERTROPHIC CARDIOMYOPATHY (MULTI): ICD-10-CM

## 2024-07-31 DIAGNOSIS — C61 PROSTATE CANCER (MULTI): ICD-10-CM

## 2024-07-31 PROCEDURE — 1036F TOBACCO NON-USER: CPT | Performed by: INTERNAL MEDICINE

## 2024-07-31 PROCEDURE — G0439 PPPS, SUBSEQ VISIT: HCPCS | Performed by: INTERNAL MEDICINE

## 2024-07-31 PROCEDURE — 1160F RVW MEDS BY RX/DR IN RCRD: CPT | Performed by: INTERNAL MEDICINE

## 2024-07-31 PROCEDURE — 1158F ADVNC CARE PLAN TLK DOCD: CPT | Performed by: INTERNAL MEDICINE

## 2024-07-31 PROCEDURE — 1123F ACP DISCUSS/DSCN MKR DOCD: CPT | Performed by: INTERNAL MEDICINE

## 2024-07-31 PROCEDURE — 99497 ADVNCD CARE PLAN 30 MIN: CPT | Performed by: INTERNAL MEDICINE

## 2024-07-31 PROCEDURE — 1170F FXNL STATUS ASSESSED: CPT | Performed by: INTERNAL MEDICINE

## 2024-07-31 PROCEDURE — 99214 OFFICE O/P EST MOD 30 MIN: CPT | Performed by: INTERNAL MEDICINE

## 2024-07-31 PROCEDURE — G0446 INTENS BEHAVE THER CARDIO DX: HCPCS | Performed by: INTERNAL MEDICINE

## 2024-07-31 PROCEDURE — 3008F BODY MASS INDEX DOCD: CPT | Performed by: INTERNAL MEDICINE

## 2024-07-31 PROCEDURE — G0444 DEPRESSION SCREEN ANNUAL: HCPCS | Performed by: INTERNAL MEDICINE

## 2024-07-31 PROCEDURE — 1159F MED LIST DOCD IN RCRD: CPT | Performed by: INTERNAL MEDICINE

## 2024-07-31 ASSESSMENT — ACTIVITIES OF DAILY LIVING (ADL)
DOING_HOUSEWORK: INDEPENDENT
DRESSING: INDEPENDENT
GROCERY_SHOPPING: INDEPENDENT
TAKING_MEDICATION: INDEPENDENT
MANAGING_FINANCES: INDEPENDENT
BATHING: INDEPENDENT

## 2024-07-31 ASSESSMENT — ANXIETY QUESTIONNAIRES
GAD7 TOTAL SCORE: 0
1. FEELING NERVOUS, ANXIOUS, OR ON EDGE: NOT AT ALL
7. FEELING AFRAID AS IF SOMETHING AWFUL MIGHT HAPPEN: NOT AT ALL
4. TROUBLE RELAXING: NOT AT ALL
IF YOU CHECKED OFF ANY PROBLEMS ON THIS QUESTIONNAIRE, HOW DIFFICULT HAVE THESE PROBLEMS MADE IT FOR YOU TO DO YOUR WORK, TAKE CARE OF THINGS AT HOME, OR GET ALONG WITH OTHER PEOPLE: NOT DIFFICULT AT ALL
2. NOT BEING ABLE TO STOP OR CONTROL WORRYING: NOT AT ALL
5. BEING SO RESTLESS THAT IT IS HARD TO SIT STILL: NOT AT ALL
6. BECOMING EASILY ANNOYED OR IRRITABLE: NOT AT ALL
3. WORRYING TOO MUCH ABOUT DIFFERENT THINGS: NOT AT ALL

## 2024-07-31 ASSESSMENT — ENCOUNTER SYMPTOMS
OCCASIONAL FEELINGS OF UNSTEADINESS: 0
LOSS OF SENSATION IN FEET: 0
DEPRESSION: 0

## 2024-07-31 NOTE — ASSESSMENT & PLAN NOTE
Up-to-date with vaccinations and screenings reevaluate in 6 months discussed advance medical directives with older son as medical power of

## 2024-07-31 NOTE — ASSESSMENT & PLAN NOTE
Paroxysmal asymptomatic rate controlled on metoprolol succinate  mg twice a day patient does not wish to initiate antiarrhythmic therapy since he is asymptomatic continues on apixaban for reduction risk and stroke doing well continue to follow closely with cardiologist

## 2024-07-31 NOTE — ASSESSMENT & PLAN NOTE
No evidence of recurrent gouty arthropathy on allopurinol 100 mg daily with uric acid levels improved to 6.5 continue

## 2024-07-31 NOTE — PROGRESS NOTES
"Depression and anxiety screening completed   PHQ9 score   GAD7 score   I spent 15 minutes obtaining and discussing depression screening using PHQ 2 questions with results documented in chart.  Screening using PHQ-9 and LIZETH-7 scores were used for follow-up with treatment and referral plan discussed.      I spent 15 minutes face-to-face with this individual discussing the cardiovascular risk and behavioral therapies of nutritional choices exercise and elimination of habits contributing to risk .We agreed on a plan how they may be able to reduce  current cardiovascular risk.  Per patient with calculation greater than 10% aspirin use was discussed and encouraged unless known allergy or increased risk of bleeding contraindicates use patient's 10-year CV risk estimate calculates:I spent greater than 15 minutes discussing advance care planning including the explanation and discussion of advanced directives.  If patient does not have current up-to-date documents examples and information provided on how to create both living will and power of . [facility] toolkit was given to patient and was encouraged to work out completing these documents.Subjective   Reason for Visit: Erwin Peña is an 73 y.o. male here for a Medicare Wellness visit.     Past Medical, Surgical, and Family History reviewed and updated in chart.    Reviewed all medications by prescribing practitioner or clinical pharmacist (such as prescriptions, OTCs, herbal therapies and supplements) and documented in the medical record.    HPI    Patient Care Team:  Trent Cantu DO as PCP - General  [facility]rent Cantu DO [facility]     Review of Systems   All other systems reviewed and are negative.      Objective   Vitals:  /70   Pulse 66   Ht 1.803 m (5' 11\")   Wt 78.9 kg (174 lb)   BMI 24.27 kg/m²       Physical Exam  Vitals and nursing note reviewed.   Constitutional:       General: He is not in acute distress.     " Appearance: Normal appearance. He is well-developed. He is not toxic-appearing.   HENT:      Head: Normocephalic and atraumatic.      Right Ear: Tympanic membrane and external ear normal.      Left Ear: Tympanic membrane and external ear normal.      Nose: Nose normal.      Mouth/Throat:      Mouth: Mucous membranes are moist.      Pharynx: Oropharynx is clear. No oropharyngeal exudate or posterior oropharyngeal erythema.      Tonsils: No tonsillar exudate. 2+ on the right. 2+ on the left.   Eyes:      Extraocular Movements: Extraocular movements intact.      Conjunctiva/sclera: Conjunctivae normal.   Cardiovascular:      Rate and Rhythm: Normal rate and regular rhythm.      Pulses: Normal pulses.      Heart sounds: Normal heart sounds. No murmur heard.  Pulmonary:      Effort: Pulmonary effort is normal.      Breath sounds: Normal breath sounds.   Abdominal:      General: Abdomen is flat. Bowel sounds are normal.      Palpations: Abdomen is soft.   Musculoskeletal:      Cervical back: Neck supple.   Feet:      Right foot:      Skin integrity: Skin integrity normal. No ulcer, blister, skin breakdown, erythema, warmth or callus.      Toenail Condition: Right toenails are normal.      Left foot:      Skin integrity: Skin integrity normal. No ulcer, blister, skin breakdown, erythema, warmth or callus.      Toenail Condition: Left toenails are normal.   Lymphadenopathy:      Cervical: No cervical adenopathy.   Skin:     General: Skin is warm and dry.      Capillary Refill: Capillary refill takes more than 3 seconds.      Findings: No rash.   Neurological:      Mental Status: He is alert. Mental status is at baseline.      Sensory: Sensation is intact.   Psychiatric:         Mood and Affect: Mood normal.         Behavior: Behavior normal.         Thought Content: Thought content normal.         Judgment: Judgment normal.     Lifestyle Recommendations  I recommend a whole-food plant-based diet, an eating pattern that  encourages the consumption of unrefined plant foods (such as fruits, vegetables, tubers, whole grains, legumes, nuts and seeds) and discourages meats, dairy products, eggs and processed foods.     The AHA/ACC recommends that the patient consume a dietary pattern that emphasizes intake of vegetables, fruits, and whole grains; includes low-fat dairy products, poultry, fish, legumes, non-tropical vegetable oils, and nuts; and limits intake of sodium, sweets, sugar-sweetened beverages, and red meats.  Adapt this dietary pattern to appropriate calorie requirements (a 500-750 kcal/day deficit to loose weight), personal and cultural food preferences, and nutrition therapy for other medical conditions (including diabetes).  Achieve this pattern by following plans such as the [place] Mediterranean, DASH dietary pattern, or AHA diet.     Engage in 2 hours and 30 minutes per week of moderate-intensity physical activity, or 1 hour and 15 minutes (75 minutes) per week of vigorous-intensity aerobic physical activity, or an equivalent combination of moderate and vigorous-intensity aerobic physical activity. Aerobic activity should be performed in episodes of at least 10 minutes preferably spread throughout the week.     Adhering to a heart healthy diet, regular exercise habits, avoidance of tobacco products, and maintenance of a healthy weight are crucial components of their heart disease risk reduction.    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 blood pressure well-controlled on amlodipine 5 mg daily and metoprolol succinate  mg twice a day         Relevant Orders    Follow Up In [facility] - PCP - Established    Comprehensive metabolic panel    Chronic gouty arthropathy [id number]     No evidence of recurrent gouty arthropathy on allopurinol 100 mg  daily with uric acid levels improved to 6.5 continue         NSVT (nonsustained ventricular tachycardia) (Multi) I47.29     Patient had complications with pericardial tamponade with ablative surgery does not wish insertion of defibrillator at this time continue to monitor closely with cardiologist         Relevant Orders    Follow Up In [facility] - PCP - Established    Comprehensive metabolic panel    Paroxysmal atrial fibrillation (Multi) I48.0     Paroxysmal asymptomatic rate controlled on metoprolol succinate  mg twice a day patient does not wish to initiate antiarrhythmic therapy since he is asymptomatic continues on apixaban for reduction risk and stroke doing well continue to follow closely with cardiologist         Relevant Orders    Follow Up In [facility] - PCP - Established    Comprehensive metabolic panel    Prostate cancer (Multi) C61     PSA 7.49 follow-up with urologist watchful waiting at this time asymptomatic         Stage 3a chronic kidney disease (Multi) N18.31     No change in function at this time no evidence of microalbuminuria continue to follow         Medicare annual wellness visit, subsequent - Primary Z00.00     Up-to-date with vaccinations and screenings reevaluate in 6 months discussed advance medical directives with older son as medical power of              Relevant Orders    Comprehensive metabolic panel    Cardiomyopathy due to hypertension, without heart failure (Multi) I11.9, I43     Stable well compensated no issues at this time follows with cardiology         Relevant Orders    Follow Up In [facility] - PCP - Established    Comprehensive metabolic panel

## 2024-07-31 NOTE — ASSESSMENT & PLAN NOTE
Patient had complications with pericardial tamponade with ablative surgery does not wish insertion of defibrillator at this time continue to monitor closely with cardiologist

## 2024-07-31 NOTE — PROGRESS NOTES
"Subjective   Reason for Visit: Erwin Peña is an 73 y.o. male here for a Medicare Wellness visit.          Reviewed all medications by prescribing practitioner or clinical pharmacist (such as prescriptions, OTCs, herbal therapies and supplements) and documented in the medical record.    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

## 2024-07-31 NOTE — ASSESSMENT & PLAN NOTE
Stable unchanged follows with cardiologist at [facility] blood pressure well-controlled on amlodipine 5 mg daily and metoprolol succinate  mg twice a day

## 2024-09-13 ENCOUNTER — PATIENT MESSAGE (OUTPATIENT)
Dept: PRIMARY CARE | Facility: CLINIC | Age: 74
End: 2024-09-13
Payer: MEDICARE

## 2024-10-28 ENCOUNTER — TELEPHONE (OUTPATIENT)
Dept: PRIMARY CARE | Facility: CLINIC | Age: 74
End: 2024-10-28
Payer: MEDICARE

## 2024-10-28 DIAGNOSIS — I48.0 PAROXYSMAL ATRIAL FIBRILLATION (MULTI): ICD-10-CM

## 2024-10-28 DIAGNOSIS — I43 CARDIOMYOPATHY DUE TO HYPERTENSION, WITHOUT HEART FAILURE: ICD-10-CM

## 2024-10-28 DIAGNOSIS — I11.9 CARDIOMYOPATHY DUE TO HYPERTENSION, WITHOUT HEART FAILURE: ICD-10-CM

## 2024-10-28 DIAGNOSIS — M1A.00X0 CHRONIC GOUTY ARTHROPATHY: ICD-10-CM

## 2024-10-28 RX ORDER — ALLOPURINOL 100 MG/1
100 TABLET ORAL DAILY
Qty: 90 TABLET | Refills: 3 | Status: SHIPPED | OUTPATIENT
Start: 2024-10-28

## 2024-10-28 RX ORDER — AMLODIPINE BESYLATE 5 MG/1
5 TABLET ORAL DAILY
Qty: 90 TABLET | Refills: 3 | Status: SHIPPED | OUTPATIENT
Start: 2024-10-28

## 2024-12-11 ENCOUNTER — APPOINTMENT (OUTPATIENT)
Dept: PODIATRY | Facility: CLINIC | Age: 74
End: 2024-12-11
Payer: MEDICARE

## 2025-01-24 ENCOUNTER — LAB (OUTPATIENT)
Dept: LAB | Facility: LAB | Age: 75
End: 2025-01-24
Payer: MEDICARE

## 2025-01-24 DIAGNOSIS — Z00.00 MEDICARE ANNUAL WELLNESS VISIT, SUBSEQUENT: ICD-10-CM

## 2025-01-24 DIAGNOSIS — I48.0 PAROXYSMAL ATRIAL FIBRILLATION (MULTI): ICD-10-CM

## 2025-01-24 DIAGNOSIS — I11.9 CARDIOMYOPATHY DUE TO HYPERTENSION, WITHOUT HEART FAILURE: ICD-10-CM

## 2025-01-24 DIAGNOSIS — I47.29 NSVT (NONSUSTAINED VENTRICULAR TACHYCARDIA) (MULTI): ICD-10-CM

## 2025-01-24 DIAGNOSIS — I43 CARDIOMYOPATHY DUE TO HYPERTENSION, WITHOUT HEART FAILURE: ICD-10-CM

## 2025-01-24 DIAGNOSIS — I42.2 APICAL VARIANT HYPERTROPHIC CARDIOMYOPATHY (MULTI): ICD-10-CM

## 2025-01-24 LAB
ALBUMIN SERPL BCP-MCNC: 4.2 G/DL (ref 3.4–5)
ALP SERPL-CCNC: 67 U/L (ref 33–136)
ALT SERPL W P-5'-P-CCNC: 17 U/L (ref 10–52)
ANION GAP SERPL CALC-SCNC: 12 MMOL/L (ref 10–20)
AST SERPL W P-5'-P-CCNC: 19 U/L (ref 9–39)
BILIRUB SERPL-MCNC: 0.9 MG/DL (ref 0–1.2)
BUN SERPL-MCNC: 23 MG/DL (ref 6–23)
CALCIUM SERPL-MCNC: 9.1 MG/DL (ref 8.6–10.3)
CHLORIDE SERPL-SCNC: 104 MMOL/L (ref 98–107)
CO2 SERPL-SCNC: 27 MMOL/L (ref 21–32)
CREAT SERPL-MCNC: 1.6 MG/DL (ref 0.5–1.3)
EGFRCR SERPLBLD CKD-EPI 2021: 45 ML/MIN/1.73M*2
GLUCOSE SERPL-MCNC: 93 MG/DL (ref 74–99)
POTASSIUM SERPL-SCNC: 4.6 MMOL/L (ref 3.5–5.3)
PROT SERPL-MCNC: 6.5 G/DL (ref 6.4–8.2)
SODIUM SERPL-SCNC: 138 MMOL/L (ref 136–145)

## 2025-01-24 PROCEDURE — 80053 COMPREHEN METABOLIC PANEL: CPT

## 2025-01-31 ENCOUNTER — APPOINTMENT (OUTPATIENT)
Dept: PRIMARY CARE | Facility: CLINIC | Age: 75
End: 2025-01-31
Payer: MEDICARE

## 2025-01-31 VITALS
HEART RATE: 68 BPM | DIASTOLIC BLOOD PRESSURE: 68 MMHG | WEIGHT: 170 LBS | SYSTOLIC BLOOD PRESSURE: 122 MMHG | HEIGHT: 71 IN | BODY MASS INDEX: 23.8 KG/M2

## 2025-01-31 DIAGNOSIS — M72.2 PLANTAR FASCIITIS OF RIGHT FOOT: ICD-10-CM

## 2025-01-31 DIAGNOSIS — I42.2 APICAL VARIANT HYPERTROPHIC CARDIOMYOPATHY (MULTI): ICD-10-CM

## 2025-01-31 DIAGNOSIS — I47.29 NSVT (NONSUSTAINED VENTRICULAR TACHYCARDIA) (MULTI): ICD-10-CM

## 2025-01-31 DIAGNOSIS — M1A.00X0 CHRONIC GOUTY ARTHROPATHY: ICD-10-CM

## 2025-01-31 DIAGNOSIS — I11.9 CARDIOMYOPATHY DUE TO HYPERTENSION, WITHOUT HEART FAILURE: ICD-10-CM

## 2025-01-31 DIAGNOSIS — I48.0 PAROXYSMAL ATRIAL FIBRILLATION (MULTI): Primary | ICD-10-CM

## 2025-01-31 DIAGNOSIS — N18.31 STAGE 3A CHRONIC KIDNEY DISEASE (MULTI): ICD-10-CM

## 2025-01-31 DIAGNOSIS — E78.5 DYSLIPIDEMIA, GOAL LDL BELOW 70: ICD-10-CM

## 2025-01-31 DIAGNOSIS — C61 PROSTATE CANCER (MULTI): ICD-10-CM

## 2025-01-31 DIAGNOSIS — I43 CARDIOMYOPATHY DUE TO HYPERTENSION, WITHOUT HEART FAILURE: ICD-10-CM

## 2025-01-31 PROCEDURE — 1160F RVW MEDS BY RX/DR IN RCRD: CPT | Performed by: INTERNAL MEDICINE

## 2025-01-31 PROCEDURE — G2211 COMPLEX E/M VISIT ADD ON: HCPCS | Performed by: INTERNAL MEDICINE

## 2025-01-31 PROCEDURE — 3008F BODY MASS INDEX DOCD: CPT | Performed by: INTERNAL MEDICINE

## 2025-01-31 PROCEDURE — 1158F ADVNC CARE PLAN TLK DOCD: CPT | Performed by: INTERNAL MEDICINE

## 2025-01-31 PROCEDURE — 1159F MED LIST DOCD IN RCRD: CPT | Performed by: INTERNAL MEDICINE

## 2025-01-31 PROCEDURE — 1036F TOBACCO NON-USER: CPT | Performed by: INTERNAL MEDICINE

## 2025-01-31 PROCEDURE — 99214 OFFICE O/P EST MOD 30 MIN: CPT | Performed by: INTERNAL MEDICINE

## 2025-01-31 PROCEDURE — 1123F ACP DISCUSS/DSCN MKR DOCD: CPT | Performed by: INTERNAL MEDICINE

## 2025-01-31 RX ORDER — METOPROLOL SUCCINATE 50 MG/1
50 TABLET, EXTENDED RELEASE ORAL 2 TIMES DAILY
COMMUNITY

## 2025-01-31 ASSESSMENT — COLUMBIA-SUICIDE SEVERITY RATING SCALE - C-SSRS
6. HAVE YOU EVER DONE ANYTHING, STARTED TO DO ANYTHING, OR PREPARED TO DO ANYTHING TO END YOUR LIFE?: NO
2. HAVE YOU ACTUALLY HAD ANY THOUGHTS OF KILLING YOURSELF?: NO
1. IN THE PAST MONTH, HAVE YOU WISHED YOU WERE DEAD OR WISHED YOU COULD GO TO SLEEP AND NOT WAKE UP?: NO

## 2025-01-31 ASSESSMENT — ANXIETY QUESTIONNAIRES
GAD7 TOTAL SCORE: 0
6. BECOMING EASILY ANNOYED OR IRRITABLE: NOT AT ALL
4. TROUBLE RELAXING: NOT AT ALL
IF YOU CHECKED OFF ANY PROBLEMS ON THIS QUESTIONNAIRE, HOW DIFFICULT HAVE THESE PROBLEMS MADE IT FOR YOU TO DO YOUR WORK, TAKE CARE OF THINGS AT HOME, OR GET ALONG WITH OTHER PEOPLE: NOT DIFFICULT AT ALL
7. FEELING AFRAID AS IF SOMETHING AWFUL MIGHT HAPPEN: NOT AT ALL
2. NOT BEING ABLE TO STOP OR CONTROL WORRYING: NOT AT ALL
3. WORRYING TOO MUCH ABOUT DIFFERENT THINGS: NOT AT ALL
1. FEELING NERVOUS, ANXIOUS, OR ON EDGE: NOT AT ALL
5. BEING SO RESTLESS THAT IT IS HARD TO SIT STILL: NOT AT ALL

## 2025-01-31 ASSESSMENT — ENCOUNTER SYMPTOMS
DEPRESSION: 0
OCCASIONAL FEELINGS OF UNSTEADINESS: 0
LOSS OF SENSATION IN FEET: 0

## 2025-01-31 NOTE — PROGRESS NOTES
"Subjective   Patient ID: Erwin Peña is a 74 y.o. male who presents for Follow-up, Heel Pain (Right foot), and lab results.    HPI     Review of Systems    Objective   /68   Pulse 68   Ht 1.803 m (5' 11\")   Wt 77.1 kg (170 lb)   BMI 23.71 kg/m²     Physical Exam    Assessment/Plan          "

## 2025-01-31 NOTE — ASSESSMENT & PLAN NOTE
Stable well compensated continue amlodipine 5 mg daily with metoprolol succinate ER 50 mg twice a day  Orders:    Follow Up In [facility] - PCP - Established

## 2025-01-31 NOTE — ASSESSMENT & PLAN NOTE
Creatinine slightly increased from 1.4-1.6 check for urine microalbumin avoidance of NSAIDs continue with adequate hydration monitor on Multaq

## 2025-01-31 NOTE — PROGRESS NOTES
"Subjective   Reason for Visit: Erwin Peña is an 74 y.o. male here for a fu visit.     Past Medical, Surgical, and Family History reviewed and updated in chart.    Reviewed all medications by prescribing practitioner or clinical pharmacist (such as prescriptions, OTCs, herbal therapies and supplements) and documented in the medical record.    HPI    Patient Care Team:  Trent Cantu DO as PCP - General  Trent Cantu DO as PCP - [facility] Attributed Provider     Review of Systems   All other systems reviewed and are negative.      Objective   Vitals:  /68   Pulse 68   Ht 1.803 m (5' 11\")   Wt 77.1 kg (170 lb)   BMI 23.71 kg/m²       Physical Exam  Vitals and nursing note reviewed.   Constitutional:       General: He is not in acute distress.     Appearance: Normal appearance. He is well-developed. He is not toxic-appearing.   HENT:      Head: Normocephalic and atraumatic.      Right Ear: Tympanic membrane and external ear normal.      Left Ear: Tympanic membrane and external ear normal.      Nose: Nose normal.      Mouth/Throat:      Mouth: Mucous membranes are moist.      Pharynx: Oropharynx is clear. No oropharyngeal exudate or posterior oropharyngeal erythema.      Tonsils: No tonsillar exudate. 2+ on the right. 2+ on the left.   Eyes:      Extraocular Movements: Extraocular movements intact.      Conjunctiva/sclera: Conjunctivae normal.   Cardiovascular:      Rate and Rhythm: Normal rate and regular rhythm.      Pulses: Normal pulses.      Heart sounds: Normal heart sounds. No murmur heard.  Pulmonary:      Effort: Pulmonary effort is normal.      Breath sounds: Normal breath sounds.   Abdominal:      General: Abdomen is flat. Bowel sounds are normal.      Palpations: Abdomen is soft.   Musculoskeletal:      Cervical back: Neck supple.   Feet:      Right foot:      Skin integrity: Skin integrity normal. No ulcer, blister, skin breakdown, erythema, warmth or callus.      Toenail " Condition: Right toenails are normal.      Left foot:      Skin integrity: Skin integrity normal. No ulcer, blister, skin breakdown, erythema, warmth or callus.      Toenail Condition: Left toenails are normal.   Lymphadenopathy:      Cervical: No cervical adenopathy.   Skin:     General: Skin is warm and dry.      Capillary Refill: Capillary refill takes more than 3 seconds.      Findings: No rash.   Neurological:      Mental Status: He is alert. Mental status is at baseline.      Sensory: Sensation is intact.   Psychiatric:         Mood and Affect: Mood normal.         Behavior: Behavior normal.         Thought Content: Thought content normal.         Judgment: Judgment normal.         Assessment & Plan  Cardiomyopathy due to hypertension, without heart failure  Stable well compensated continue amlodipine 5 mg daily with metoprolol succinate ER 50 mg twice a day  Orders:    Follow Up In [facility] - PCP - Established    Apical variant hypertrophic cardiomyopathy (Multi)  Stable on metoprolol succinate ER 50 mg twice a day with amlodipine 5 mg daily  Orders:    Follow Up In [facility] - PCP - Established    NSVT (nonsustained ventricular tachycardia) (Multi)  Placed on Multaq) 400 mg twice a day monitor short and long-term effects of Multaq normal sinus rhythm  Orders:    Follow Up In [facility] - PCP - Established    dronedarone (Multaq) 400 mg tablet; Take 1 tablet (400 mg) by mouth 2 times daily (morning and late afternoon).    Paroxysmal atrial fibrillation (Multi)  Continue  Multaq 400 mg twice a day as prescribed by cardiologist continue apixaban 5 mg twice a day lab work stable reevaluate in 6 months with thyroid and liver and kidney function  Orders:    Follow Up In [facility] - PCP - Established    dronedarone (Multaq) 400 mg tablet; Take 1 tablet (400 mg) by mouth 2 times daily (morning and late afternoon).    CBC and Auto Differential; Future    Comprehensive  Metabolic Panel; Future    Lipid Panel; Future    Albumin-Creatinine Ratio, Urine Random; Future    Tsh With Reflex To Free T4 If Abnormal; Future    Plantar fasciitis of right foot  Given educational resources on improving pain through cord stretching and exercises       Stage 3a chronic kidney disease (Multi)  Creatinine slightly increased from 1.4-1.6 check for urine microalbumin avoidance of NSAIDs continue with adequate hydration monitor on Multaq       Prostate cancer (Multi)  Repeat PSA stable at this time  Orders:    PSA; Future    Chronic gouty arthropathy  No recurrent gouty events on allopurinol and colchicine check uric acid levels  Orders:    Uric acid; Future    Dyslipidemia, goal LDL below 70    Orders:    Lipid Panel; Future

## 2025-01-31 NOTE — ASSESSMENT & PLAN NOTE
Stable on metoprolol succinate ER 50 mg twice a day with amlodipine 5 mg daily  Orders:    Follow Up In [facility] - PCP - Established

## 2025-01-31 NOTE — ASSESSMENT & PLAN NOTE
Placed on Multaq) 400 mg twice a day monitor short and long-term effects of Multaq normal sinus rhythm  Orders:    Follow Up In [facility] - PCP - Established    dronedarone (Multaq) 400 mg tablet; Take 1 tablet (400 mg) by mouth 2 times daily (morning and late afternoon).

## 2025-01-31 NOTE — ASSESSMENT & PLAN NOTE
Continue  Multaq 400 mg twice a day as prescribed by cardiologist continue apixaban 5 mg twice a day lab work stable reevaluate in 6 months with thyroid and liver and kidney function  Orders:    Follow Up In [facility] - PCP - Established    dronedarone (Multaq) 400 mg tablet; Take 1 tablet (400 mg) by mouth 2 times daily (morning and late afternoon).    CBC and Auto Differential; Future    Comprehensive Metabolic Panel; Future    Lipid Panel; Future    Albumin-Creatinine Ratio, Urine Random; Future    Tsh With Reflex To Free T4 If Abnormal; Future

## 2025-01-31 NOTE — ASSESSMENT & PLAN NOTE
No recurrent gouty events on allopurinol and colchicine check uric acid levels  Orders:    Uric acid; Future

## 2025-05-13 ENCOUNTER — TELEPHONE (OUTPATIENT)
Dept: PRIMARY CARE | Facility: CLINIC | Age: 75
End: 2025-05-13
Payer: MEDICARE

## 2025-05-13 DIAGNOSIS — M1A.9XX0 CHRONIC GOUTY ARTHROPATHY: Primary | ICD-10-CM

## 2025-05-13 RX ORDER — COLCHICINE 0.6 MG/1
0.6 TABLET ORAL DAILY
Qty: 90 TABLET | Refills: 3 | Status: SHIPPED | OUTPATIENT
Start: 2025-05-13

## 2025-05-13 NOTE — TELEPHONE ENCOUNTER
Requested Prescriptions     Pending Prescriptions Disp Refills    colchicine 0.6 mg tablet 90 tablet 3     Sig: Take 1 tablet (0.6 mg) by mouth once daily.     Lov 1/31/25    Nov 8/1/25

## 2025-06-12 DIAGNOSIS — Z12.11 SCREENING FOR COLORECTAL CANCER: ICD-10-CM

## 2025-06-12 DIAGNOSIS — Z12.12 SCREENING FOR COLORECTAL CANCER: ICD-10-CM

## 2025-06-25 LAB — NONINV COLON CA DNA+OCC BLD SCRN STL QL: NEGATIVE

## 2025-07-23 LAB
ALBUMIN SERPL-MCNC: 4.4 G/DL (ref 3.6–5.1)
ALBUMIN/CREAT UR: 3 MG/G CREAT
ALP SERPL-CCNC: 67 U/L (ref 35–144)
ALT SERPL-CCNC: 16 U/L (ref 9–46)
ANION GAP SERPL CALCULATED.4IONS-SCNC: 9 MMOL/L (CALC) (ref 7–17)
AST SERPL-CCNC: 19 U/L (ref 10–35)
BASOPHILS # BLD AUTO: 50 CELLS/UL (ref 0–200)
BASOPHILS NFR BLD AUTO: 0.7 %
BILIRUB SERPL-MCNC: 0.8 MG/DL (ref 0.2–1.2)
BUN SERPL-MCNC: 39 MG/DL (ref 7–25)
CALCIUM SERPL-MCNC: 9.2 MG/DL (ref 8.6–10.3)
CHLORIDE SERPL-SCNC: 104 MMOL/L (ref 98–110)
CHOLEST SERPL-MCNC: 146 MG/DL
CHOLEST/HDLC SERPL: 3.8 (CALC)
CO2 SERPL-SCNC: 27 MMOL/L (ref 20–32)
CREAT SERPL-MCNC: 1.67 MG/DL (ref 0.7–1.28)
CREAT UR-MCNC: 107 MG/DL (ref 20–320)
EGFRCR SERPLBLD CKD-EPI 2021: 43 ML/MIN/1.73M2
EOSINOPHIL # BLD AUTO: 192 CELLS/UL (ref 15–500)
EOSINOPHIL NFR BLD AUTO: 2.7 %
ERYTHROCYTE [DISTWIDTH] IN BLOOD BY AUTOMATED COUNT: 12.5 % (ref 11–15)
GLUCOSE SERPL-MCNC: 115 MG/DL (ref 65–139)
HCT VFR BLD AUTO: 44.7 % (ref 38.5–50)
HDLC SERPL-MCNC: 38 MG/DL
HGB BLD-MCNC: 14.8 G/DL (ref 13.2–17.1)
LDLC SERPL CALC-MCNC: 77 MG/DL (CALC)
LYMPHOCYTES # BLD AUTO: 1960 CELLS/UL (ref 850–3900)
LYMPHOCYTES NFR BLD AUTO: 27.6 %
MCH RBC QN AUTO: 31.1 PG (ref 27–33)
MCHC RBC AUTO-ENTMCNC: 33.1 G/DL (ref 32–36)
MCV RBC AUTO: 93.9 FL (ref 80–100)
MICROALBUMIN UR-MCNC: 0.3 MG/DL
MONOCYTES # BLD AUTO: 625 CELLS/UL (ref 200–950)
MONOCYTES NFR BLD AUTO: 8.8 %
NEUTROPHILS # BLD AUTO: 4274 CELLS/UL (ref 1500–7800)
NEUTROPHILS NFR BLD AUTO: 60.2 %
NONHDLC SERPL-MCNC: 108 MG/DL (CALC)
PLATELET # BLD AUTO: 197 THOUSAND/UL (ref 140–400)
PMV BLD REES-ECKER: 11.5 FL (ref 7.5–12.5)
POTASSIUM SERPL-SCNC: 4.5 MMOL/L (ref 3.5–5.3)
PROT SERPL-MCNC: 7 G/DL (ref 6.1–8.1)
PSA SERPL-MCNC: 5.39 NG/ML
RBC # BLD AUTO: 4.76 MILLION/UL (ref 4.2–5.8)
SODIUM SERPL-SCNC: 140 MMOL/L (ref 135–146)
TRIGL SERPL-MCNC: 225 MG/DL
TSH SERPL-ACNC: 2.92 MIU/L (ref 0.4–4.5)
URATE SERPL-MCNC: 7.1 MG/DL (ref 4–8)
WBC # BLD AUTO: 7.1 THOUSAND/UL (ref 3.8–10.8)

## 2025-08-01 ENCOUNTER — APPOINTMENT (OUTPATIENT)
Dept: PRIMARY CARE | Facility: CLINIC | Age: 75
End: 2025-08-01
Payer: MEDICARE

## 2025-08-01 VITALS
DIASTOLIC BLOOD PRESSURE: 71 MMHG | BODY MASS INDEX: 23.52 KG/M2 | HEIGHT: 71 IN | SYSTOLIC BLOOD PRESSURE: 132 MMHG | HEART RATE: 48 BPM | WEIGHT: 168 LBS

## 2025-08-01 DIAGNOSIS — I48.0 PAROXYSMAL ATRIAL FIBRILLATION (MULTI): ICD-10-CM

## 2025-08-01 DIAGNOSIS — I11.9 CARDIOMYOPATHY DUE TO HYPERTENSION, WITHOUT HEART FAILURE: ICD-10-CM

## 2025-08-01 DIAGNOSIS — M1A.9XX0 CHRONIC GOUTY ARTHROPATHY: ICD-10-CM

## 2025-08-01 DIAGNOSIS — C61 PROSTATE CANCER (MULTI): ICD-10-CM

## 2025-08-01 DIAGNOSIS — N18.31 STAGE 3A CHRONIC KIDNEY DISEASE (MULTI): ICD-10-CM

## 2025-08-01 DIAGNOSIS — I43 CARDIOMYOPATHY DUE TO HYPERTENSION, WITHOUT HEART FAILURE: ICD-10-CM

## 2025-08-01 DIAGNOSIS — I42.2 APICAL VARIANT HYPERTROPHIC CARDIOMYOPATHY (MULTI): ICD-10-CM

## 2025-08-01 DIAGNOSIS — Z00.00 MEDICARE ANNUAL WELLNESS VISIT, SUBSEQUENT: Primary | ICD-10-CM

## 2025-08-01 DIAGNOSIS — R73.02 IGT (IMPAIRED GLUCOSE TOLERANCE): ICD-10-CM

## 2025-08-01 PROCEDURE — G0446 INTENS BEHAVE THER CARDIO DX: HCPCS | Performed by: INTERNAL MEDICINE

## 2025-08-01 PROCEDURE — G0439 PPPS, SUBSEQ VISIT: HCPCS | Performed by: INTERNAL MEDICINE

## 2025-08-01 PROCEDURE — 1170F FXNL STATUS ASSESSED: CPT | Performed by: INTERNAL MEDICINE

## 2025-08-01 PROCEDURE — 99214 OFFICE O/P EST MOD 30 MIN: CPT | Performed by: INTERNAL MEDICINE

## 2025-08-01 PROCEDURE — 1159F MED LIST DOCD IN RCRD: CPT | Performed by: INTERNAL MEDICINE

## 2025-08-01 PROCEDURE — 1160F RVW MEDS BY RX/DR IN RCRD: CPT | Performed by: INTERNAL MEDICINE

## 2025-08-01 PROCEDURE — G0444 DEPRESSION SCREEN ANNUAL: HCPCS | Performed by: INTERNAL MEDICINE

## 2025-08-01 PROCEDURE — 3008F BODY MASS INDEX DOCD: CPT | Performed by: INTERNAL MEDICINE

## 2025-08-01 PROCEDURE — 99497 ADVNCD CARE PLAN 30 MIN: CPT | Performed by: INTERNAL MEDICINE

## 2025-08-01 ASSESSMENT — ACTIVITIES OF DAILY LIVING (ADL)
MANAGING_FINANCES: INDEPENDENT
BATHING: INDEPENDENT
TAKING_MEDICATION: INDEPENDENT
DRESSING: INDEPENDENT
GROCERY_SHOPPING: INDEPENDENT
DOING_HOUSEWORK: INDEPENDENT

## 2025-08-01 ASSESSMENT — PATIENT HEALTH QUESTIONNAIRE - PHQ9
SUM OF ALL RESPONSES TO PHQ9 QUESTIONS 1 AND 2: 0
2. FEELING DOWN, DEPRESSED OR HOPELESS: NOT AT ALL
1. LITTLE INTEREST OR PLEASURE IN DOING THINGS: NOT AT ALL
2. FEELING DOWN, DEPRESSED OR HOPELESS: NOT AT ALL
SUM OF ALL RESPONSES TO PHQ9 QUESTIONS 1 AND 2: 0
1. LITTLE INTEREST OR PLEASURE IN DOING THINGS: NOT AT ALL

## 2025-08-01 ASSESSMENT — ANXIETY QUESTIONNAIRES
7. FEELING AFRAID AS IF SOMETHING AWFUL MIGHT HAPPEN: NOT AT ALL
GAD7 TOTAL SCORE: 0
6. BECOMING EASILY ANNOYED OR IRRITABLE: NOT AT ALL
5. BEING SO RESTLESS THAT IT IS HARD TO SIT STILL: NOT AT ALL
2. NOT BEING ABLE TO STOP OR CONTROL WORRYING: NOT AT ALL
4. TROUBLE RELAXING: NOT AT ALL
3. WORRYING TOO MUCH ABOUT DIFFERENT THINGS: NOT AT ALL
IF YOU CHECKED OFF ANY PROBLEMS ON THIS QUESTIONNAIRE, HOW DIFFICULT HAVE THESE PROBLEMS MADE IT FOR YOU TO DO YOUR WORK, TAKE CARE OF THINGS AT HOME, OR GET ALONG WITH OTHER PEOPLE: NOT DIFFICULT AT ALL
1. FEELING NERVOUS, ANXIOUS, OR ON EDGE: NOT AT ALL

## 2025-08-01 ASSESSMENT — ENCOUNTER SYMPTOMS
OCCASIONAL FEELINGS OF UNSTEADINESS: 0
LOSS OF SENSATION IN FEET: 0
DEPRESSION: 0

## 2025-08-01 ASSESSMENT — COLUMBIA-SUICIDE SEVERITY RATING SCALE - C-SSRS
6. HAVE YOU EVER DONE ANYTHING, STARTED TO DO ANYTHING, OR PREPARED TO DO ANYTHING TO END YOUR LIFE?: NO
1. IN THE PAST MONTH, HAVE YOU WISHED YOU WERE DEAD OR WISHED YOU COULD GO TO SLEEP AND NOT WAKE UP?: NO
2. HAVE YOU ACTUALLY HAD ANY THOUGHTS OF KILLING YOURSELF?: NO

## 2025-08-01 NOTE — ASSESSMENT & PLAN NOTE
PSA stable and reduced from previous at 5.39 continue to monitor closely with urologist  Orders:    PSA; Future

## 2025-08-01 NOTE — ASSESSMENT & PLAN NOTE
Patient could benefit from trial of ARB plus or minus SGLT2 inhibitor therapy possibly in place of amlodipine which may be contributing to chronic lower extremity edema in the setting of venous insufficiency no evidence of congestive heart failure or microalbuminuria at this time creatinine stable at 1.6 with GFR of 43 continue to monitor closely  Orders:    Comprehensive Metabolic Panel; Future    Hemoglobin A1C; Future    Lipid Panel; Future    Albumin-Creatinine Ratio, Urine Random; Future

## 2025-08-01 NOTE — ASSESSMENT & PLAN NOTE
No exacerbations at this time with uric acid level 7.1 continue to monitor consider SGLT2 inhibitor therapy for reduction in uric acid level and/or ARB such as losartan continues on colchicine 0.6 mg daily with allopurinol 100 mg daily stable repeat uric acid level next visit  Orders:    Uric acid; Future

## 2025-08-01 NOTE — PROGRESS NOTES
Subjective   Reason for Visit: Erwin Peña is an 74 y.o. male here for a Medicare Wellness visit.     Past Medical, Surgical, and Family History reviewed and updated in chart.    Reviewed all medications by prescribing practitioner or clinical pharmacist (such as prescriptions, OTCs, herbal therapies and supplements) and documented in the medical record.    HPI    Patient Care Team:  Trent Cantu DO as PCP - General  Trent Cantu DO as PCP - [facility] Attributed Provider     Review of Systems   Cardiovascular:  Positive for leg swelling.   All other systems reviewed and are negative.  Lifestyle Recommendations  I recommend a whole-food plant-based diet, an eating pattern that encourages the consumption of unrefined plant foods (such as fruits, vegetables, tubers, whole grains, legumes, nuts and seeds) and discourages meats, dairy products, eggs and processed foods.     The AHA/ACC recommends that the patient consume a dietary pattern that emphasizes intake of vegetables, fruits, and whole grains; includes low-fat dairy products, poultry, fish, legumes, non-tropical vegetable oils, and nuts; and limits intake of sodium, sweets, sugar-sweetened beverages, and red meats.  Adapt this dietary pattern to appropriate calorie requirements (a 500-750 kcal/day deficit to loose weight), personal and cultural food preferences, and nutrition therapy for other medical conditions (including diabetes).  Achieve this pattern by following plans such as the [place] Mediterranean, DASH dietary pattern, or AHA diet.     Engage in 2 hours and 30 minutes per week of moderate-intensity physical activity, or 1 hour and 15 minutes (75 minutes) per week of vigorous-intensity aerobic physical activity, or an equivalent combination of moderate and vigorous-intensity aerobic physical activity. Aerobic activity should be performed in episodes of at least 10 minutes preferably spread throughout the week.     Adhering to a heart  "healthy diet, regular exercise habits, avoidance of tobacco products, and maintenance of a healthy weight are crucial components of their heart disease risk reduction.  This may not meet the criteria for a clinical depression diagnosis. Symptoms were reviewed with Erwin.  Follow-up within the next 3 months is recommended to re-assess symptoms and monitor mental health status.     Objective   Vitals:  /71   Pulse (!) 48   Ht 1.803 m (5' 11\")   Wt 76.2 kg (168 lb)   BMI 23.43 kg/m²       Physical Exam  Vitals and nursing note reviewed.   Constitutional:       General: He is not in acute distress.     Appearance: Normal appearance. He is well-developed. He is not toxic-appearing.   HENT:      Head: Normocephalic and atraumatic.      Right Ear: Tympanic membrane and external ear normal.      Left Ear: Tympanic membrane and external ear normal.      Nose: Nose normal.      Mouth/Throat:      Mouth: Mucous membranes are moist.      Pharynx: Oropharynx is clear. No oropharyngeal exudate or posterior oropharyngeal erythema.      Tonsils: No tonsillar exudate. 2+ on the right. 2+ on the left.     Eyes:      Extraocular Movements: Extraocular movements intact.      Conjunctiva/sclera: Conjunctivae normal.       Cardiovascular:      Rate and Rhythm: Normal rate and regular rhythm.      Pulses: Normal pulses.      Heart sounds: Normal heart sounds. No murmur heard.  Pulmonary:      Effort: Pulmonary effort is normal.      Breath sounds: Normal breath sounds.   Abdominal:      General: Abdomen is flat. Bowel sounds are normal.      Palpations: Abdomen is soft.     Musculoskeletal:         General: No swelling.      Cervical back: Neck supple.      Right lower leg: Edema present.      Left lower leg: Edema present.   Feet:      Right foot:      Skin integrity: Skin integrity normal. No ulcer, blister, skin breakdown, erythema, warmth or callus.      Toenail Condition: Right toenails are normal.      Left foot:      " Skin integrity: Skin integrity normal. No ulcer, blister, skin breakdown, erythema, warmth or callus.      Toenail Condition: Left toenails are normal.   Lymphadenopathy:      Cervical: No cervical adenopathy.     Skin:     General: Skin is warm and dry.      Capillary Refill: Capillary refill takes more than 3 seconds.      Findings: No rash.     Neurological:      Mental Status: He is alert. Mental status is at baseline.      Sensory: Sensation is intact.     Psychiatric:         Mood and Affect: Mood normal.         Behavior: Behavior normal.         Thought Content: Thought content normal.         Judgment: Judgment normal.         Assessment & Plan  Chronic gouty arthropathy  No exacerbations at this time with uric acid level 7.1 continue to monitor consider SGLT2 inhibitor therapy for reduction in uric acid level and/or ARB such as losartan continues on colchicine 0.6 mg daily with allopurinol 100 mg daily stable repeat uric acid level next visit  Orders:    Uric acid; Future    Paroxysmal atrial fibrillation (Multi)  Stable rate and rhythm control alendronate around 400 mg twice a day the metoprolol succinate ER 50 mg twice a day continue apixaban 5 mg twice a day  Orders:    Comprehensive Metabolic Panel; Future    Hemoglobin A1C; Future    Lipid Panel; Future    Albumin-Creatinine Ratio, Urine Random; Future    Prostate cancer (Multi)  PSA stable and reduced from previous at 5.39 continue to monitor closely with urologist  Orders:    PSA; Future    Stage 3a chronic kidney disease (Multi)  Patient could benefit from trial of ARB plus or minus SGLT2 inhibitor therapy possibly in place of amlodipine which may be contributing to chronic lower extremity edema in the setting of venous insufficiency no evidence of congestive heart failure or microalbuminuria at this time creatinine stable at 1.6 with GFR of 43 continue to monitor closely  Orders:    Comprehensive Metabolic Panel; Future    Hemoglobin A1C; Future     Lipid Panel; Future    Albumin-Creatinine Ratio, Urine Random; Future    Medicare annual wellness visit, subsequent  Encourage vaccinations with pneumococcal vaccine and Tdap vaccine and RSV vaccine patient defers at this time consider evaluation for abdominal aortic aneurysm screening follow-up in 6 months       Cardiomyopathy due to hypertension, without heart failure  Stable without signs or symptoms of heart failure at this time continue to monitor closely with cardiologist  Orders:    Comprehensive Metabolic Panel; Future    Hemoglobin A1C; Future    Lipid Panel; Future    Albumin-Creatinine Ratio, Urine Random; Future    Follow Up In [facility] - PCP - Established; Future    IGT (impaired glucose tolerance)  Mild impaired fasting sugar 115 reevaluate fasting with next visit with hemoglobin A1c  Orders:    Hemoglobin A1C; Future    Apical variant hypertrophic cardiomyopathy (Multi)  Continues on stroke risk prevention in the setting of hypertensive cardiomyopathy followed closely with cardiologist with history of paroxysmal atrial fibrillation and nonsustained VT              Advance Directives Discussion  16 - 20 minutes were spent discussing Advanced Care Planning (including a Living Will, Medical Power Of , as well as specific end of life choices and/or directives). The details of that discussion were documented in Advanced Directives Discussion section of the medical record.     Cardiac Risk Assessment  15 - 20 minutes were spent discussing Cardiovascular risk and, if needed, lifestyle modifications were recommended, including nutritional choices, exercise, and elimination of habits contributing to risk.   Aspirin use/disuse was discussed following the guidelines below:  low dose ASA ( mg) should be considered:    If prior Heart Attack/Stroke/Peripheral vascular disease:  Generally recommend daily low dose aspirin unless extremely high bleeding risk (e.g., gastrointestinal).    If  no prior Heart Attack/Stroke/Peripheral vascular disease:              Age over 70: Do not use Aspirin for prevention    Age less than 70 and 10-year cardiovascular disease risk is >20%: use low dose Aspirin for prevention.                 Depression Screening  5 - 10 minutes were spent screening for depression.

## 2025-08-01 NOTE — ASSESSMENT & PLAN NOTE
Stable without signs or symptoms of heart failure at this time continue to monitor closely with cardiologist  Orders:    Comprehensive Metabolic Panel; Future    Hemoglobin A1C; Future    Lipid Panel; Future    Albumin-Creatinine Ratio, Urine Random; Future    Follow Up In [facility] - PCP - Established; Future

## 2025-08-01 NOTE — PROGRESS NOTES
"Subjective   Reason for Visit: Erwin Peña is an 74 y.o. male here for a Medicare Wellness visit.     Past Medical, Surgical, and Family History reviewed and updated in chart.    Reviewed all medications by prescribing practitioner or clinical pharmacist (such as prescriptions, OTCs, herbal therapies and supplements) and documented in the medical record.    HPI    Patient Care Team:  Trent Cantu DO as PCP - General  Trent Cantu DO as PCP - [facility] Attributed Provider     Review of Systems   All other systems reviewed and are negative.      Objective   Vitals:  /71   Pulse (!) 48   Ht 1.803 m (5' 11\")   Wt 76.2 kg (168 lb)   BMI 23.43 kg/m²       Physical Exam  Vitals and nursing note reviewed.   Constitutional:       General: He is not in acute distress.     Appearance: Normal appearance. He is well-developed. He is not toxic-appearing.   HENT:      Head: Normocephalic and atraumatic.      Right Ear: Tympanic membrane and external ear normal.      Left Ear: Tympanic membrane and external ear normal.      Nose: Nose normal.      Mouth/Throat:      Mouth: Mucous membranes are moist.      Pharynx: Oropharynx is clear. No oropharyngeal exudate or posterior oropharyngeal erythema.      Tonsils: No tonsillar exudate. 2+ on the right. 2+ on the left.     Eyes:      Extraocular Movements: Extraocular movements intact.      Conjunctiva/sclera: Conjunctivae normal.       Cardiovascular:      Rate and Rhythm: Normal rate and regular rhythm.      Pulses: Normal pulses.      Heart sounds: Normal heart sounds. No murmur heard.  Pulmonary:      Effort: Pulmonary effort is normal.      Breath sounds: Normal breath sounds.   Abdominal:      General: Abdomen is flat. Bowel sounds are normal.      Palpations: Abdomen is soft.     Musculoskeletal:      Cervical back: Neck supple.   Feet:      Right foot:      Skin integrity: Skin integrity normal. No ulcer, blister, skin breakdown, erythema, warmth or " callus.      Toenail Condition: Right toenails are normal.      Left foot:      Skin integrity: Skin integrity normal. No ulcer, blister, skin breakdown, erythema, warmth or callus.      Toenail Condition: Left toenails are normal.   Lymphadenopathy:      Cervical: No cervical adenopathy.     Skin:     General: Skin is warm and dry.      Capillary Refill: Capillary refill takes more than 3 seconds.      Findings: No rash.     Neurological:      Mental Status: He is alert. Mental status is at baseline.      Sensory: Sensation is intact.     Psychiatric:         Mood and Affect: Mood normal.         Behavior: Behavior normal.         Thought Content: Thought content normal.         Judgment: Judgment normal.         Assessment & Plan  Chronic gouty arthropathy    Orders:    Uric acid; Future    Paroxysmal atrial fibrillation (Multi)    Orders:    Comprehensive Metabolic Panel; Future    Hemoglobin A1C; Future    Lipid Panel; Future    Albumin-Creatinine Ratio, Urine Random; Future    Prostate cancer (Multi)    Orders:    PSA; Future    Stage 3a chronic kidney disease (Multi)    Orders:    Comprehensive Metabolic Panel; Future    Hemoglobin A1C; Future    Lipid Panel; Future    Albumin-Creatinine Ratio, Urine Random; Future    Medicare annual wellness visit, subsequent         Cardiomyopathy due to hypertension, without heart failure    Orders:    Comprehensive Metabolic Panel; Future    Hemoglobin A1C; Future    Lipid Panel; Future    Albumin-Creatinine Ratio, Urine Random; Future    Follow Up In [facility] - PCP - Established; Future    IGT (impaired glucose tolerance)    Orders:    Hemoglobin A1C; Future

## 2025-08-01 NOTE — ASSESSMENT & PLAN NOTE
Encourage vaccinations with pneumococcal vaccine and Tdap vaccine and RSV vaccine patient defers at this time consider evaluation for abdominal aortic aneurysm screening follow-up in 6 months

## 2025-08-01 NOTE — ASSESSMENT & PLAN NOTE
Continues on stroke risk prevention in the setting of hypertensive cardiomyopathy followed closely with cardiologist with history of paroxysmal atrial fibrillation and nonsustained VT

## 2025-08-01 NOTE — PROGRESS NOTES
"Subjective   Reason for Visit: Erwin Peña is an 74 y.o. male here for a Medicare Wellness visit.               HPI    Patient Care Team:  Trent Cantu DO as PCP - General  Trent Cantu DO as PCP - [facility]     Review of Systems    Objective   Vitals:  /71   Pulse (!) 48   Ht 1.803 m (5' 11\")   Wt 76.2 kg (168 lb)   BMI 23.43 kg/m²       Physical Exam    Assessment & Plan              "

## 2025-08-01 NOTE — ASSESSMENT & PLAN NOTE
Stable rate and rhythm control alendronate around 400 mg twice a day the metoprolol succinate ER 50 mg twice a day continue apixaban 5 mg twice a day  Orders:    Comprehensive Metabolic Panel; Future    Hemoglobin A1C; Future    Lipid Panel; Future    Albumin-Creatinine Ratio, Urine Random; Future

## 2026-02-02 ENCOUNTER — APPOINTMENT (OUTPATIENT)
Dept: PRIMARY CARE | Facility: CLINIC | Age: 76
End: 2026-02-02
Payer: MEDICARE

## 2026-02-13 ENCOUNTER — APPOINTMENT (OUTPATIENT)
Dept: PRIMARY CARE | Facility: CLINIC | Age: 76
End: 2026-02-13
Payer: MEDICARE